# Patient Record
Sex: MALE | Race: WHITE | NOT HISPANIC OR LATINO | URBAN - METROPOLITAN AREA
[De-identification: names, ages, dates, MRNs, and addresses within clinical notes are randomized per-mention and may not be internally consistent; named-entity substitution may affect disease eponyms.]

---

## 2018-07-06 ENCOUNTER — OFFICE VISIT (OUTPATIENT)
Dept: URBAN - METROPOLITAN AREA CLINIC 76 | Facility: CLINIC | Age: 78
End: 2018-07-06
Payer: MEDICARE

## 2018-07-06 DIAGNOSIS — H35.3213 EXUDATIVE AGE-REL MCLR DEGN, RIGHT EYE, WITH INACTIVE SCAR: Primary | ICD-10-CM

## 2018-07-06 PROCEDURE — 92134 CPTRZ OPH DX IMG PST SGM RTA: CPT | Performed by: OPHTHALMOLOGY

## 2018-07-06 PROCEDURE — 99213 OFFICE O/P EST LOW 20 MIN: CPT | Performed by: OPHTHALMOLOGY

## 2018-07-06 ASSESSMENT — INTRAOCULAR PRESSURE
OD: 14
OS: 14

## 2018-07-06 NOTE — IMPRESSION/PLAN
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 Plan: OCT ordered and performed today. Discussed diagnosis with patient. 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 There is currently no effective treatment for sub retinal scarring from a choroidal  neovascular membrane. Recommend close observation and amsler grid monitoring.

## 2018-07-06 NOTE — IMPRESSION/PLAN
Impression: Retinal hemorrhage, left eye: H35.62. Plan: OCT ordered and performed today. Upon slit lamp exam I did see some bleeding in the peripapillary bundle. Discussed with patient recommend close observation and Patient to call if vision worsens.

## 2018-08-14 ENCOUNTER — OFFICE VISIT (OUTPATIENT)
Dept: URBAN - METROPOLITAN AREA CLINIC 76 | Facility: CLINIC | Age: 78
End: 2018-08-14
Payer: MEDICARE

## 2018-08-14 DIAGNOSIS — H57.13 OCULAR PAIN, BILATERAL: Primary | ICD-10-CM

## 2018-08-14 PROCEDURE — 99213 OFFICE O/P EST LOW 20 MIN: CPT | Performed by: OPTOMETRIST

## 2018-08-14 ASSESSMENT — INTRAOCULAR PRESSURE
OD: 13
OS: 13

## 2018-08-14 NOTE — IMPRESSION/PLAN
Impression: Ocular pain, bilateral: H57.13. ideopathic. no inflammation seen today. Plan: Discussed condition. Recommend Ilevro QD OU until gone- sample given. Pt to call if symptoms persist or worsen.

## 2018-08-14 NOTE — IMPRESSION/PLAN
Impression: Retinal hemorrhage, left eye: H35.62. Plan: Under care of Dr. Matilda Irizarry. Scheduled for 10/08/18.

## 2018-10-08 ENCOUNTER — OFFICE VISIT (OUTPATIENT)
Dept: URBAN - METROPOLITAN AREA CLINIC 76 | Facility: CLINIC | Age: 78
End: 2018-10-08
Payer: MEDICARE

## 2018-10-08 DIAGNOSIS — H35.62 RETINAL HEMORRHAGE, LEFT EYE: ICD-10-CM

## 2018-10-08 PROCEDURE — 99213 OFFICE O/P EST LOW 20 MIN: CPT | Performed by: OPHTHALMOLOGY

## 2018-10-08 PROCEDURE — 92134 CPTRZ OPH DX IMG PST SGM RTA: CPT | Performed by: OPHTHALMOLOGY

## 2018-10-08 ASSESSMENT — INTRAOCULAR PRESSURE
OS: 12
OD: 14

## 2018-10-08 NOTE — IMPRESSION/PLAN
Impression: Retinal hemorrhage, left eye: H35.62. Plan: OCT ordered and performed today. Upon slit lamp exam I did see some bleeding in the peripapillary bundle. Discussed with patient recommend close observation at this time, will re eval in 3 month to rule out possible PP macular heme vs Wet AMD.  Patient understands and agrees with plan. Patient to call if vision worsens or changes.

## 2018-10-08 NOTE — IMPRESSION/PLAN
Impression: Exudative age-rel mclr degn, right eye, with inactive scar: H35.9573. Plan: OCT ordered and performed today. Discussed diagnosis with patient. 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 There is currently no effective treatment for sub retinal scarring from a choroidal  neovascular membrane. Recommend close observation and amsler grid monitoring.

## 2019-01-14 ENCOUNTER — OFFICE VISIT (OUTPATIENT)
Dept: URBAN - METROPOLITAN AREA CLINIC 76 | Facility: CLINIC | Age: 79
End: 2019-01-14
Payer: MEDICARE

## 2019-01-14 DIAGNOSIS — H43.812 VITREOUS DETACHMENT OF LEFT EYE: Primary | ICD-10-CM

## 2019-01-14 PROCEDURE — 92134 CPTRZ OPH DX IMG PST SGM RTA: CPT | Performed by: OPHTHALMOLOGY

## 2019-01-14 PROCEDURE — 99213 OFFICE O/P EST LOW 20 MIN: CPT | Performed by: OPHTHALMOLOGY

## 2019-01-14 ASSESSMENT — INTRAOCULAR PRESSURE
OS: 11
OD: 14

## 2019-01-14 NOTE — IMPRESSION/PLAN
Impression: Vitreous detachment of left eye: H43.812. Plan: OCT ordered and performed today. Discussed diagnosis with patient. The clinical exam with scleral depression is consistent with Posterior Vitreous Detachment. Fortunately, no retinal breaks were identified. The warning signs and symptoms of retinal breaks/detachment, including worsening flashes and new onset of floaters and development of a shadow/curtain in the peripheral field were reviewed. The patient understands to call immediately if any of these symptoms are experienced.